# Patient Record
Sex: FEMALE | Race: BLACK OR AFRICAN AMERICAN | NOT HISPANIC OR LATINO | Employment: UNEMPLOYED | URBAN - METROPOLITAN AREA
[De-identification: names, ages, dates, MRNs, and addresses within clinical notes are randomized per-mention and may not be internally consistent; named-entity substitution may affect disease eponyms.]

---

## 2017-02-06 ENCOUNTER — LAB VISIT (OUTPATIENT)
Dept: LAB | Facility: HOSPITAL | Age: 62
End: 2017-02-06
Attending: FAMILY MEDICINE
Payer: COMMERCIAL

## 2017-02-06 DIAGNOSIS — R73.01 ELEVATED FASTING GLUCOSE: ICD-10-CM

## 2017-02-06 LAB
ANION GAP SERPL CALC-SCNC: 8 MMOL/L
BUN SERPL-MCNC: 22 MG/DL
CALCIUM SERPL-MCNC: 9.2 MG/DL
CHLORIDE SERPL-SCNC: 110 MMOL/L
CO2 SERPL-SCNC: 26 MMOL/L
CREAT SERPL-MCNC: 0.8 MG/DL
EST. GFR  (AFRICAN AMERICAN): >60 ML/MIN/1.73 M^2
EST. GFR  (NON AFRICAN AMERICAN): >60 ML/MIN/1.73 M^2
GLUCOSE SERPL-MCNC: 105 MG/DL
POTASSIUM SERPL-SCNC: 4.1 MMOL/L
SODIUM SERPL-SCNC: 144 MMOL/L

## 2017-02-06 PROCEDURE — 80048 BASIC METABOLIC PNL TOTAL CA: CPT

## 2017-02-06 PROCEDURE — 36415 COLL VENOUS BLD VENIPUNCTURE: CPT | Mod: PO

## 2017-02-06 PROCEDURE — 83036 HEMOGLOBIN GLYCOSYLATED A1C: CPT

## 2017-02-07 LAB
ESTIMATED AVG GLUCOSE: 120 MG/DL
HBA1C MFR BLD HPLC: 5.8 %

## 2017-02-13 ENCOUNTER — HOSPITAL ENCOUNTER (OUTPATIENT)
Dept: RADIOLOGY | Facility: HOSPITAL | Age: 62
Discharge: HOME | End: 2017-02-13
Attending: FAMILY MEDICINE
Payer: COMMERCIAL

## 2017-02-13 ENCOUNTER — OFFICE VISIT (OUTPATIENT)
Dept: FAMILY MEDICINE | Facility: CLINIC | Age: 62
End: 2017-02-13
Payer: COMMERCIAL

## 2017-02-13 VITALS
RESPIRATION RATE: 16 BRPM | HEART RATE: 59 BPM | SYSTOLIC BLOOD PRESSURE: 130 MMHG | BODY MASS INDEX: 33.34 KG/M2 | WEIGHT: 165.38 LBS | DIASTOLIC BLOOD PRESSURE: 84 MMHG | HEIGHT: 59 IN

## 2017-02-13 DIAGNOSIS — M25.512 LEFT SHOULDER PAIN, UNSPECIFIED CHRONICITY: ICD-10-CM

## 2017-02-13 DIAGNOSIS — M48.061 LUMBAR SPINAL STENOSIS: ICD-10-CM

## 2017-02-13 DIAGNOSIS — I10 BENIGN ESSENTIAL HYPERTENSION: Primary | ICD-10-CM

## 2017-02-13 DIAGNOSIS — R73.01 ELEVATED FASTING GLUCOSE: ICD-10-CM

## 2017-02-13 DIAGNOSIS — M51.36 DDD (DEGENERATIVE DISC DISEASE), LUMBAR: ICD-10-CM

## 2017-02-13 PROCEDURE — 3079F DIAST BP 80-89 MM HG: CPT | Mod: S$GLB,,, | Performed by: FAMILY MEDICINE

## 2017-02-13 PROCEDURE — 73030 X-RAY EXAM OF SHOULDER: CPT | Mod: TC,PO,LT

## 2017-02-13 PROCEDURE — 99214 OFFICE O/P EST MOD 30 MIN: CPT | Mod: S$GLB,,, | Performed by: FAMILY MEDICINE

## 2017-02-13 PROCEDURE — 99999 PR PBB SHADOW E&M-EST. PATIENT-LVL III: CPT | Mod: PBBFAC,,, | Performed by: FAMILY MEDICINE

## 2017-02-13 PROCEDURE — 73030 X-RAY EXAM OF SHOULDER: CPT | Mod: 26,LT,, | Performed by: RADIOLOGY

## 2017-02-13 PROCEDURE — 3075F SYST BP GE 130 - 139MM HG: CPT | Mod: S$GLB,,, | Performed by: FAMILY MEDICINE

## 2017-02-13 NOTE — PROGRESS NOTES
Subjective:     THIS DOCUMENT WAS MADE IN PART WITH [employer] DI[employer]ATION SOFTWARE.  OCCASIONALLY THIS SOFTWARE WILL MISINTERPRET WORDS OR PHRASES.       Patient ID: Malia Horn is a 61 y.o. female.    Chief Complaint: Hypertension (3 month follow up with labs; Hm tetanus vaccine ) and Shoulder Pain (left )    HPI     Hypertension, chronic condition, worsening control, elevated today.  However repeat normal. Quiet sounds, I don't think nurse heard accurately    elevated fasting glucose, improved.  Her A1c is now below 6.    Lumbar degenerative disc disease and spinal stenosis.  Chronic condition that is stable.  It causes constant pain and limits activity.  But she does not want to go back to pain management because of cost issues.    Left shoulder pain, no injury, pain with movement of the arm.      Active Ambulatory Problems     Diagnosis Date Noted    Benign essential hypertension     Elevated fasting glucose 12/25/2012    Visit for gynecologic examination 07/14/2014    DDD (degenerative disc disease), lumbar 03/09/2015    Lumbar stenosis 03/09/2015    Lumbar spinal stenosis 05/18/2015     Resolved Ambulatory Problems     Diagnosis Date Noted    No Resolved Ambulatory Problems     No Additional Past Medical History       Review of Systems   Respiratory: Negative for shortness of breath and wheezing.    Cardiovascular: Negative for chest pain and leg swelling.   Gastrointestinal: Negative for abdominal pain.   Musculoskeletal: Positive for arthralgias and back pain.   Neurological: Negative for speech difficulty and light-headedness.       Objective:      Physical Exam    Assessment:       1. Benign essential hypertension    2. Elevated fasting glucose    3. Lumbar spinal stenosis    4. DDD (degenerative disc disease), lumbar        Plan:     Malia was seen today for hypertension and shoulder pain.    Diagnoses and all orders for this visit:    Benign essential hypertension    Elevated fasting  glucose    Lumbar spinal stenosis    DDD (degenerative disc disease), lumbar    Left shoulder pain, unspecified chronicity  -     X-Ray Shoulder 2 or More Views Left; Future

## 2017-02-13 NOTE — PROGRESS NOTES
Subjective:       Patient ID: Malia Horn is a 61 y.o. female.    Chief Complaint: Hypertension (3 month follow up with labs; Hm tetanus vaccine ) and Shoulder Pain (left )    Hypertension   Pertinent negatives include no chest pain or shortness of breath.   Shoulder Pain        Review of Systems   Constitutional: Positive for fatigue.   Respiratory: Negative for shortness of breath.    Cardiovascular: Negative for chest pain.   Musculoskeletal: Positive for back pain and gait problem.   Neurological: Positive for weakness.       Objective:      Physical Exam   Constitutional: She is oriented to person, place, and time. She appears well-developed and well-nourished.   HENT:   Head: Normocephalic and atraumatic.   Eyes: No scleral icterus.   Cardiovascular: Normal rate, regular rhythm and normal heart sounds.    No murmur heard.  Pulmonary/Chest: Effort normal and breath sounds normal. No respiratory distress.   Musculoskeletal:   Antalgic gait.    Chronic muscle stiffness and decreased range of motion in the lumbar and thoracic region.    Left shoulder reveals pain with abduction, passive and active   Neurological: She is alert and oriented to person, place, and time.   Skin: Skin is dry. No rash noted. She is not diaphoretic.   Psychiatric: She has a normal mood and affect. Her behavior is normal.   Vitals reviewed.      Assessment:       1. Benign essential hypertension    2. Elevated fasting glucose    3. Lumbar spinal stenosis    4. DDD (degenerative disc disease), lumbar    5. Left shoulder pain, unspecified chronicity        Plan:       Malia was seen today for hypertension and shoulder pain.    Diagnoses and all orders for this visit:    Benign essential hypertension  Elevated, but she did not take her medicine today.  She is also anxious today.  She will monitor at home.  She will record these.  She'll return in a few weeks for nurse visit    Elevated fasting glucose  Improved    Lumbar spinal  stenosis  DDD (degenerative disc disease), lumbar  She declined any refills today and declined referral back to pain management.    Left shoulder pain, unspecified chronicity  -     X-Ray Shoulder 2 or More Views Left; Future  She declined orthopedics but will let me know if she reconsiders

## 2017-02-13 NOTE — MR AVS SNAPSHOT
[facility]  [address] [facility] [address]  [facility] [address]  Phone: [phone]  Fax: [phone]                  Malia Horn   2017 9:00 AM   Office Visit    Description:  Female : 1955   Provider:  Romel Ignacio MD   Department:  [facility]           Reason for Visit     Hypertension     Shoulder Pain           Diagnoses this Visit        Comments    Benign essential hypertension    -  Primary     Elevated fasting glucose         Lumbar spinal stenosis         DDD (degenerative disc disease), lumbar         Left shoulder pain, unspecified chronicity                To Do List           Future Appointments        Provider Department Dept Phone    2017 1:30 PM [facility] XR2 [facility] [phone]    5/15/2017 8:00 AM Romel Ignacio MD [facility] [phone]      Goals (5 Years of Data)     None      [facility]     [facility] Care [facility] - 24/ Assistance  Registered nurses in the [facility] provide clinical advisement, health education, appointment booking, and other advisory services.  Call for this free service at [phone].             Medications           Message regarding Medications     Verify the changes and/or additions to your medication regime listed below are the same as discussed with your clinician today.  If any of these changes or additions are incorrect, please notify your healthcare provider.             Verify that the below list of medications is an accurate representation of the medications you are currently taking.  If none reported, the list may be blank. If incorrect, please contact your healthcare provider. Carry this list with you in case of emergency.           Current Medications     amlodipine (NORVASC) 5 MG tablet Take 1 tablet (5 mg total) by mouth once daily.    aspirin-acetaminophen (GOODY'S BODY PAIN) 500-325 mg Pack Take by mouth.       "gabapentin (NEURONTIN) 100 MG capsule Take 2 capsules (200 mg total) by mouth 3 (three) times daily.    hydrocodone-acetaminophen 5-325mg (NORCO) 5-325 mg per tablet Take 1 tablet by mouth every 6 (six) hours as needed for Pain.    lisinopril-hydrochlorothiazide (PRINZIDE,ZESTORETIC) 20-12.5 mg per tablet Take 1 tablet by mouth once daily.    tizanidine (ZANAFLEX) 4 MG tablet Take one to 2 po q 6 hours prn muscle spasm    tramadol (ULTRAM) 50 mg tablet Take 1 tablet (50 mg total) by mouth every 6 (six) hours as needed for Pain.    diazepam (VALIUM) 10 MG Tab Take 1 tablet 30 minutes prior to MRI.  Must have someone drive you to and from MRI.    meloxicam (MOBIC) 15 MG tablet Take 1 tablet (15 mg total) by mouth once daily.           Clinical Reference Information           Your Vitals Were     BP Pulse Resp Height Weight BMI    130/84 59 16 4' 11" (1.499 m) 75 kg (165 lb 5.5 oz) 33.4 kg/m2      Blood Pressure          Most Recent Value    BP  130/84      Allergies as of 2/13/2017     No Known Allergies      Immunizations Administered on Date of Encounter - 2/13/2017     None      Orders Placed During Today's Visit     Future Labs/Procedures Expected by Expires    X-Ray Shoulder 2 or More Views Left  2/13/2017 2/13/2018      Language Assistance Services     ATTENTION: Language assistance services are available, free of charge. Please call [phone].      ATENCIÓN: Si habla constanza, tiene a garibay disposición servicios gratuitos de asistencia lingüística. Llame al [phone].     [facility] Ý: N?u b?n nói Ti?ng Vi?t, có các d?ch v? h? tr? ngôn ng? mi?n phí dành cho b?n. G?i s? [phone].         [facility] complies with applicable Federal civil rights laws and does not discriminate on the basis of race, color, national origin, age, disability, or sex.        "

## 2018-06-27 DIAGNOSIS — Z12.39 BREAST CANCER SCREENING: ICD-10-CM

## 2019-09-04 ENCOUNTER — TELEPHONE (OUTPATIENT)
Dept: FAMILY MEDICINE | Facility: CLINIC | Age: 64
End: 2019-09-04